# Patient Record
(demographics unavailable — no encounter records)

---

## 2024-10-11 NOTE — ASSESSMENT
[FreeTextEntry1] : I had a long discussion with the patient regarding her treatment plan and diagnosis.  At this point we will focus on conservative management.  This will be focus on physical therapy, activity modifications.  I will have her follow-up with me in approximately 4 weeks for repeat clinical evaluation.  We did discuss the role of pain management.  At this point she does not want to pursue this.

## 2024-10-11 NOTE — ADDENDUM
[FreeTextEntry1] : I, Quinn Davies, acted solely as a scribe for Dr. Marques Harden MD on this date 10/11/2024  All medical record entries made by the Scribe were at my, Dr. Marques Harden MD., direction and personally dictated by me on 10/11/2024. I have reviewed the chart and agree that the record accurately reflects my personal performance of the history, physical exam, assessment and plan. I have also personally directed, reviewed, and agreed with the chart.

## 2024-10-11 NOTE — HISTORY OF PRESENT ILLNESS
[de-identified] : Patient is a 76y/o female who presents for initial evaluation of low back pain and left lower extremity radiculopathy. Denies any bowel/bladder incontinence. Denies any saddle anesthesia. She was admitted to [facility] on 9/29 and was recently discharge. She states her pain has decreased and recently had her physical therapy evaluation and is starting next week. Shes taking Gabapentin and a Medrol dose pack.

## 2024-10-11 NOTE — PHYSICAL EXAM
[de-identified] : Lumbar Physical Exam   Gait - antalgic gait   Station - Normal   Sagittal balance - Normal   Compensatory mechanism? - None   Heel walk - Normal   Toe Walk - Normal   Reflexes  Patellar - normal  Gastroc - normal  Clonus - No   Hip Exam - Normal   Straight leg raise - none   Pulses - 2+ dp/pt   Range of motion - normal   Sensation Sensation intact to light touch in L1, L2, L3, L4, L5 and S1 dermatomes bilaterally   Motor  IP Quad HS TA Gastroc EHL   Right       5/5              5/5         5/5           5/5              5/5        5/5  Left         5/5 5/5 5/5           5/5 5/5        5/5